# Patient Record
Sex: MALE | Race: WHITE | Employment: FULL TIME | URBAN - METROPOLITAN AREA
[De-identification: names, ages, dates, MRNs, and addresses within clinical notes are randomized per-mention and may not be internally consistent; named-entity substitution may affect disease eponyms.]

---

## 2019-07-03 ENCOUNTER — OFFICE VISIT (OUTPATIENT)
Dept: URGENT CARE | Facility: PHYSICIAN GROUP | Age: 24
End: 2019-07-03

## 2019-07-03 VITALS
HEART RATE: 92 BPM | TEMPERATURE: 97.4 F | SYSTOLIC BLOOD PRESSURE: 132 MMHG | RESPIRATION RATE: 16 BRPM | DIASTOLIC BLOOD PRESSURE: 80 MMHG | HEIGHT: 72 IN | OXYGEN SATURATION: 99 %

## 2019-07-03 DIAGNOSIS — K20.90 ESOPHAGITIS: ICD-10-CM

## 2019-07-03 PROCEDURE — 99204 OFFICE O/P NEW MOD 45 MIN: CPT | Performed by: FAMILY MEDICINE

## 2019-07-03 RX ORDER — SUCRALFATE 1 G/1
1 TABLET ORAL
Qty: 120 TAB | Refills: 0 | Status: SHIPPED | OUTPATIENT
Start: 2019-07-03

## 2019-07-03 ASSESSMENT — ENCOUNTER SYMPTOMS
NAUSEA: 0
SORE THROAT: 0
VOMITING: 0
IRREGULAR HEARTBEAT: 0
EYE PAIN: 0
DIZZINESS: 0
LEG PAIN: 0
COUGH: 0
ABDOMINAL PAIN: 0
MYALGIAS: 0
PALPITATIONS: 0
EXERTIONAL CHEST PRESSURE: 0
FEVER: 0
CHILLS: 0
PND: 0
NEAR-SYNCOPE: 0
SHORTNESS OF BREATH: 0
NUMBNESS: 0

## 2019-07-03 NOTE — PATIENT INSTRUCTIONS
Esophagitis  Introduction  Esophagitis is inflammation of the esophagus. The esophagus is the tube that carries food and liquids from your mouth to your stomach. Esophagitis can cause soreness or pain in the esophagus. This condition can make it difficult and painful to swallow.  What are the causes?  Most causes of esophagitis are not serious. Common causes of this condition include:  · Gastroesophageal reflux disease (GERD). This is when stomach contents move back up into the esophagus (reflux).  · Repeated vomiting.  · An allergic-type reaction, especially caused by food allergies (eosinophilic esophagitis).  · Injury to the esophagus by swallowing large pills with or without water, or swallowing certain types of medicines.  · Swallowing (ingesting) harmful chemicals, such as household cleaning products.  · Heavy alcohol use.  · An infection of the esophagus. This most often occurs in people who have a weakened immune system.  · Radiation or chemotherapy treatment for cancer.  · Certain diseases such as sarcoidosis, Crohn disease, and scleroderma.  What are the signs or symptoms?  Symptoms of this condition include:  · Difficult or painful swallowing.  · Pain with swallowing acidic liquids, such as citrus juices.  · Pain with burping.  · Chest pain.  · Difficulty breathing.  · Nausea.  · Vomiting.  · Pain in the abdomen.  · Weight loss.  · Ulcers in the mouth.  · Patches of white material in the mouth (candidiasis).  · Fever.  · Coughing up blood or vomiting blood.  · Stool that is black, tarry, or bright red.  How is this diagnosed?  Your health care provider will take a medical history and perform a physical exam. You may also have other tests, including:  · An endoscopy to examine your stomach and esophagus with a small camera.  · A test that measures the acidity level in your esophagus.  · A test that measures how much pressure is on your esophagus.  · A barium swallow or modified barium swallow to show the  shape, size, and functioning of your esophagus.  · Allergy tests.  How is this treated?  Treatment for this condition depends on the cause of your esophagitis. In some cases, steroids or other medicines may be given to help relieve your symptoms or to treat the underlying cause of your condition. You may have to make some lifestyle changes, such as:  · Avoiding alcohol.  · Quitting smoking.  · Changing your diet.  · Exercising.  · Changing your sleep habits and your sleep environment.  Follow these instructions at home:  Take these actions to decrease your discomfort and to help avoid complications.  Diet  · Follow a diet as recommended by your health care provider. This may involve avoiding foods and drinks such as:  ¨ Coffee and tea (with or without caffeine).  ¨ Drinks that contain alcohol.  ¨ Energy drinks and sports drinks.  ¨ Carbonated drinks or sodas.  ¨ Chocolate and cocoa.  ¨ Peppermint and mint flavorings.  ¨ Garlic and onions.  ¨ Horseradish.  ¨ Spicy and acidic foods, including peppers, chili powder, pace powder, vinegar, hot sauces, and barbecue sauce.  ¨ Citrus fruit juices and citrus fruits, such as oranges, eduarda, and limes.  ¨ Tomato-based foods, such as red sauce, chili, salsa, and pizza with red sauce.  ¨ Fried and fatty foods, such as donuts, french fries, potato chips, and high-fat dressings.  ¨ High-fat meats, such as hot dogs and fatty cuts of red and white meats, such as rib eye steak, sausage, ham, and contreras.  ¨ High-fat dairy items, such as whole milk, butter, and cream cheese.  · Eat small, frequent meals instead of large meals.  · Avoid drinking large amounts of liquid with your meals.  · Avoid eating meals during the 2-3 hours before bedtime.  · Avoid lying down right after you eat.  · Do not exercise right after you eat.  · Avoid foods and drinks that seem to make your symptoms worse.  General instructions  · Pay attention to any changes in your symptoms.  · Take over-the-counter and  prescription medicines only as told by your health care provider. Do not take aspirin, ibuprofen, or other NSAIDs unless your health care provider told you to do so.  · If you have trouble taking pills, use a pill splitter to decrease the size of the pill. This will decrease the chance of the pill getting stuck or injuring your esophagus on the way down. Also, drink water after you take a pill.  · Do not use any tobacco products, including cigarettes, chewing tobacco, and e-cigarettes. If you need help quitting, ask your health care provider.  · Wear loose-fitting clothing. Do not wear anything tight around your waist that causes pressure on your abdomen.  · Raise (elevate) the head of your bed about 6 inches (15 cm).  · Try to reduce your stress, such as with yoga or meditation. If you need help reducing stress, ask your health care provider.  · If you are overweight, reduce your weight to an amount that is healthy for you. Ask your health care provider for guidance about a safe weight loss goal.  · Keep all follow-up visits as told by your health care provider. This is important.  Contact a health care provider if:  · You have new symptoms.  · You have unexplained weight loss.  · You have difficulty swallowing, or it hurts to swallow.  · You have wheezing or a persistent cough.  · Your symptoms do not improve with treatment.  · You have frequent heartburn for more than two weeks.  Get help right away if:  · You have severe pain in your arms, neck, jaw, teeth, or back.  · You feel sweaty, dizzy, or light-headed.  · You have chest pain or shortness of breath.  · You vomit and your vomit looks like blood or coffee grounds.  · Your stool is bloody or black.  · You have a fever.  · You cannot swallow, drink, or eat.  This information is not intended to replace advice given to you by your health care provider. Make sure you discuss any questions you have with your health care provider.  Document Released: 01/25/2006  Document Revised: 05/25/2017 Document Reviewed: 04/13/2016  © 2017 [employer]

## 2019-07-03 NOTE — PROGRESS NOTES
Subjective:     Tim Chandler is a 23 y.o. male who presents for Chest Pressure (x 3 days, discomfort )       Chest Pain    This is a new problem. The current episode started in the past 7 days. The onset quality is sudden. The problem occurs constantly. The problem has been waxing and waning. The pain is mild. The quality of the pain is described as dull and squeezing. Pertinent negatives include no abdominal pain, cough, dizziness, exertional chest pressure, fever, irregular heartbeat, leg pain, nausea, near-syncope, numbness, palpitations, PND, shortness of breath or vomiting.   History reviewed. No pertinent past medical history.History reviewed. No pertinent surgical history.  Social History     Social History   • Marital status: Single     Spouse name: N/A   • Number of children: N/A   • Years of education: N/A     Occupational History   • Not on file.     Social History Main Topics   • Smoking status: Never Smoker   • Smokeless tobacco: Never Used   • Alcohol use Yes   • Drug use: No   • Sexual activity: Not on file     Other Topics Concern   • Not on file     Social History Narrative   • No narrative on file      Family History   Problem Relation Age of Onset   • Stroke Neg Hx    • Heart Disease Neg Hx     Review of Systems   Constitutional: Negative for chills and fever.   HENT: Negative for sore throat.    Eyes: Negative for pain.   Respiratory: Negative for cough and shortness of breath.    Cardiovascular: Positive for chest pain. Negative for palpitations, PND and near-syncope.   Gastrointestinal: Negative for abdominal pain, nausea and vomiting.   Genitourinary: Negative for hematuria.   Musculoskeletal: Negative for myalgias.   Skin: Negative for rash.   Neurological: Negative for dizziness and numbness.   No Known Allergies   Objective:   /80   Pulse 92   Temp 36.3 °C (97.4 °F) (Temporal)   Resp 16   Ht 1.829 m (6')   SpO2 99%   Physical Exam   Constitutional: He is oriented  to person, place, and time. He appears well-developed and well-nourished. No distress.   HENT:   Head: Normocephalic and atraumatic.   Eyes: Pupils are equal, round, and reactive to light. Conjunctivae and EOM are normal.   Cardiovascular: Normal rate and regular rhythm.    No murmur heard.  Pulmonary/Chest: Effort normal and breath sounds normal. No respiratory distress.   Abdominal: Soft. He exhibits no distension. There is no tenderness. There is no rebound and no guarding.   Neurological: He is alert and oriented to person, place, and time. He has normal reflexes. No sensory deficit.   Skin: Skin is warm and dry.   Psychiatric: He has a normal mood and affect.         Assessment/Plan:   Assessment    1. Esophagitis  - sucralfate (CARAFATE) 1 GM Tab; Take 1 Tab by mouth 4 Times a Day,Before Meals and at Bedtime.  Dispense: 120 Tab; Refill: 0  Improvement in symptoms with in-office GI cocktail.  Differential diagnosis, natural history, supportive care, and indications for immediate follow-up discussed.